# Patient Record
Sex: FEMALE | Race: WHITE
[De-identification: names, ages, dates, MRNs, and addresses within clinical notes are randomized per-mention and may not be internally consistent; named-entity substitution may affect disease eponyms.]

---

## 2019-08-20 ENCOUNTER — HOSPITAL ENCOUNTER (EMERGENCY)
Dept: HOSPITAL 4 - SED | Age: 44
Discharge: HOME | End: 2019-08-20
Payer: MEDICAID

## 2019-08-20 VITALS — HEIGHT: 63 IN | WEIGHT: 130 LBS | BODY MASS INDEX: 23.04 KG/M2

## 2019-08-20 VITALS — SYSTOLIC BLOOD PRESSURE: 158 MMHG

## 2019-08-20 VITALS — SYSTOLIC BLOOD PRESSURE: 121 MMHG

## 2019-08-20 DIAGNOSIS — M26.629: Primary | ICD-10-CM

## 2019-08-20 PROCEDURE — 96372 THER/PROPH/DIAG INJ SC/IM: CPT

## 2019-08-20 PROCEDURE — 99283 EMERGENCY DEPT VISIT LOW MDM: CPT

## 2019-08-20 RX ADMIN — KETOROLAC TROMETHAMINE ONE MG: 60 INJECTION, SOLUTION INTRAMUSCULAR at 18:13

## 2019-08-20 NOTE — NUR
Pt driven to ER by her boyfriend. Pt A/O x4, with no signs of acute distress. 
Pt states she has had increase in chronic jaw pain over past 24 hours. pt 
states that she has taken ibuprofen with no relief. pt denies chest pain, sob, 
dizziness, blured vision, or any other medical complaint at this time. Pt 
resting comfortably in er bed. will continue to monitor.

## 2019-08-20 NOTE — NUR
Patient given written and verbal discharge instructions and verbalizes 
understanding.  ER MD discussed with patient the results and treatment 
provided. Patient in stable condition. ID arm band removed.

Rx of tramadol and prednisone given. Patient educated on pain management and to 
follow up with PMD. Pain Scale 2/10.

Opportunity for questions provided and answered. Medication side effect fact 
sheet provided.

## 2019-10-13 ENCOUNTER — HOSPITAL ENCOUNTER (EMERGENCY)
Dept: HOSPITAL 4 - SED | Age: 44
Discharge: HOME | End: 2019-10-13
Payer: MEDICAID

## 2019-10-13 VITALS — BODY MASS INDEX: 23.04 KG/M2 | HEIGHT: 63 IN | WEIGHT: 130 LBS

## 2019-10-13 VITALS — SYSTOLIC BLOOD PRESSURE: 154 MMHG

## 2019-10-13 DIAGNOSIS — R03.0: ICD-10-CM

## 2019-10-13 DIAGNOSIS — Z90.710: ICD-10-CM

## 2019-10-13 DIAGNOSIS — M26.622: Primary | ICD-10-CM

## 2019-10-13 PROCEDURE — 96372 THER/PROPH/DIAG INJ SC/IM: CPT

## 2019-10-13 PROCEDURE — 99283 EMERGENCY DEPT VISIT LOW MDM: CPT

## 2019-10-13 PROCEDURE — 81025 URINE PREGNANCY TEST: CPT

## 2019-10-13 NOTE — NUR
pt arrives w/ left jaw pain since this morning. Pt states that she bit into a 
peanut and has had pain since. No other c/o at the moment

## 2019-10-13 NOTE — NUR
Patient given written and verbal discharge instructions and verbalizes 
understanding.  ER MD discussed with patient the results and treatment 
provided. Patient in stable condition. ID arm band removed. 

Rx of Tramadol abnd Prednsione given. Patient educated on pain management and 
to follow up with PMD. Pain Scale 3/10.

Opportunity for questions provided and answered. Medication side effect fact 
sheet provided.

## 2020-02-16 ENCOUNTER — HOSPITAL ENCOUNTER (EMERGENCY)
Dept: HOSPITAL 4 - SED | Age: 45
Discharge: HOME | End: 2020-02-16
Payer: MEDICAID

## 2020-02-16 VITALS — SYSTOLIC BLOOD PRESSURE: 157 MMHG

## 2020-02-16 VITALS — WEIGHT: 135 LBS | HEIGHT: 63 IN | BODY MASS INDEX: 23.92 KG/M2

## 2020-02-16 VITALS — SYSTOLIC BLOOD PRESSURE: 131 MMHG

## 2020-02-16 DIAGNOSIS — M26.603: Primary | ICD-10-CM

## 2020-02-16 PROCEDURE — 96372 THER/PROPH/DIAG INJ SC/IM: CPT

## 2020-02-16 PROCEDURE — 99283 EMERGENCY DEPT VISIT LOW MDM: CPT

## 2020-05-02 ENCOUNTER — HOSPITAL ENCOUNTER (EMERGENCY)
Dept: HOSPITAL 4 - SED | Age: 45
Discharge: HOME | End: 2020-05-02
Payer: MEDICAID

## 2020-05-02 VITALS — SYSTOLIC BLOOD PRESSURE: 140 MMHG

## 2020-05-02 VITALS — BODY MASS INDEX: 23.39 KG/M2 | WEIGHT: 132 LBS | HEIGHT: 63 IN

## 2020-05-02 VITALS — SYSTOLIC BLOOD PRESSURE: 137 MMHG

## 2020-05-02 DIAGNOSIS — Z90.710: ICD-10-CM

## 2020-05-02 DIAGNOSIS — R68.84: Primary | ICD-10-CM

## 2020-07-16 ENCOUNTER — HOSPITAL ENCOUNTER (EMERGENCY)
Dept: HOSPITAL 4 - SED | Age: 45
Discharge: HOME | End: 2020-07-16
Payer: MEDICAID

## 2020-07-16 VITALS — HEIGHT: 63 IN | BODY MASS INDEX: 23.04 KG/M2 | WEIGHT: 130 LBS

## 2020-07-16 VITALS — SYSTOLIC BLOOD PRESSURE: 161 MMHG

## 2020-07-16 VITALS — SYSTOLIC BLOOD PRESSURE: 147 MMHG

## 2020-07-16 DIAGNOSIS — Z90.710: ICD-10-CM

## 2020-07-16 DIAGNOSIS — R68.84: ICD-10-CM

## 2020-07-16 DIAGNOSIS — G89.29: Primary | ICD-10-CM

## 2023-02-03 ENCOUNTER — HOSPITAL ENCOUNTER (EMERGENCY)
Dept: HOSPITAL 4 - SED | Age: 48
Discharge: HOME | End: 2023-02-03
Payer: MEDICAID

## 2023-02-03 VITALS — SYSTOLIC BLOOD PRESSURE: 130 MMHG

## 2023-02-03 VITALS — HEIGHT: 63 IN | WEIGHT: 145 LBS | BODY MASS INDEX: 25.69 KG/M2

## 2023-02-03 DIAGNOSIS — Z79.899: ICD-10-CM

## 2023-02-03 DIAGNOSIS — K04.7: Primary | ICD-10-CM

## 2023-02-03 NOTE — NUR
MEDICATED AS ORDERED, INFORMED PT TO STAY IN LOBBY FOR 30 MIN FOR ANY ADVERSE 
REACTION, PT VERBALIZED UNDERSTANDING

## 2023-02-03 NOTE — NUR
Patient given written and verbal discharge instructions and verbalizes 
understanding.  ER MD discussed with patient the results and treatment 
provided. Patient in stable condition. ID arm band removed. IV catheter removed 
intact and dressing applied, no active bleeding.

Rx of AMOXICILLIN, PERCOCET given. Patient educated on pain management and to 
follow up with PMD. Pain Scale .

Opportunity for questions provided and answered. Medication side effect fact 
sheet provided.